# Patient Record
Sex: MALE | Race: WHITE
[De-identification: names, ages, dates, MRNs, and addresses within clinical notes are randomized per-mention and may not be internally consistent; named-entity substitution may affect disease eponyms.]

---

## 2020-11-02 NOTE — CR
PROCEDURE INFORMATION:

Exam: XR Chest, 2 Views

Exam date and time: 11/2/2020 11:28 AM

Age: 30 years old

Clinical indication: Chest pain



TECHNIQUE:

Imaging protocol: XR of the chest

Views: 2 views.



COMPARISON:

No relevant prior studies available.



FINDINGS:

Lungs: Unremarkable. No consolidation.

Pleural space: Unremarkable. No pleural effusion. No pneumothorax.

Heart/Mediastinum: Unremarkable. No cardiomegaly.

Bones/joints: Unremarkable.



IMPRESSION:

No evidence for acute pulmonary disease.



Thank you for allowing us to participate in the care of your patient.



Dictated and Authenticated by: Hardik Clifton MD

11/02/2020 1:02 PM Central Time ([place] & [place])

[facility]

## 2021-11-07 NOTE — EDM.PDOC
ED HPI GENERAL MEDICAL PROBLEM





- General


Chief Complaint: Chest Pain


Stated Complaint: CHEST PAIN


Time Seen by Provider: 11/07/21 11:22


Source of Information: Reports: Patient, RN Notes Reviewed





- History of Present Illness


INITIAL COMMENTS - FREE TEXT/NARRATIVE: 





Pt has been having L sided chest pain off and on for about a month.  Has chronic

anxiety.  Had onset of L chest pain after a walk this morning.  It is now gone. 

Has not been recently ill.  Hx Htn. 


  ** chest


Pain Score (Numeric/FACES): 3





- Related Data


                                    Allergies











Allergy/AdvReac Type Severity Reaction Status Date / Time


 


No Known Allergies Allergy   Verified 11/07/21 11:36











Home Meds: 


                                    Home Meds





lisinopriL [Lisinopril] 1 tab BEDTIME 11/07/21 [History]











Past Medical History


Cardiovascular History: Reports: Hypertension


Respiratory History: Reports: Bronchitis, Recurrent


Gastrointestinal History: Reports: Chronic Constipation, Other (See Below)


Other Gastrointestinal History: in childhood.


Psychiatric History: Reports: Anxiety


Other Psychiatric History: self-diagnosed.





- Infectious Disease History


Infectious Disease History: Reports: Chicken Pox





Social & Family History





- Tobacco Use


Tobacco Use Status *Q: Never Tobacco User





- Caffeine Use


Caffeine Use: Reports: None





- Recreational Drug Use


Recreational Drug Use: Yes


Recreational Drug Type: Reports: Marijuana/Hashish





ED ROS GENERAL





- Review of Systems


Review Of Systems: See Below


Constitutional: Reports: No Symptoms


HEENT: Reports: No Symptoms


Respiratory: Denies: Shortness of Breath, Pleuritic Chest Pain, Cough


Cardiovascular: Reports: Chest Pain


GI/Abdominal: Denies: Abdominal Pain, Nausea, Vomiting


Musculoskeletal: Denies: Shoulder Pain, Arm Pain


Skin: Reports: No Symptoms


Neurological: Reports: No Symptoms





ED EXAM, GENERAL





- Physical Exam


Exam: See Below


General Appearance: Alert, Anxious


Throat/Mouth: Normal Inspection


Head: Atraumatic


Neck: Supple


Respiratory/Chest: No Respiratory Distress, Lungs Clear, Normal Breath Sounds, 

Chest Non-Tender


Cardiovascular: Regular Rate, Rhythm


GI/Abdominal: Soft, Non-Tender.  No: Guarding


Extremities: Normal Inspection.  No: Leg Pain, Increased Warmth, Redness


Neurological: Alert, Oriented, No Motor/Sensory Deficits


Skin Exam: Warm, Dry, Normal Color


  ** #1 Interpretation


EKG Date: 11/07/21


Rhythm: NSR


Axis: Normal


P-Wave: Present


QRS: Normal


ST-T: Normal


QT: Normal





Course





- Vital Signs


Last Recorded V/S: 


                                Last Vital Signs











Temp  98.8 F   11/07/21 11:28


 


Pulse  75   11/07/21 13:28


 


Resp  17   11/07/21 13:28


 


BP  132/78   11/07/21 13:28


 


Pulse Ox  98   11/07/21 13:28














- Orders/Labs/Meds


Labs: 


                                Laboratory Tests











  11/07/21 Range/Units





  12:15 


 


Sodium  137  (136-145)  mEq/L


 


Potassium  4.0  (3.5-5.1)  mEq/L


 


Chloride  100  ()  mEq/L


 


Carbon Dioxide  27  (21-32)  mEq/L


 


Anion Gap  14.0  (5-15)  


 


BUN  9  (7-18)  mg/dL


 


Creatinine  1.0  (0.7-1.3)  mg/dL


 


Est Cr Clr Drug Dosing  108.49  mL/min


 


Estimated GFR (MDRD)  > 60  (>60)  mL/min


 


BUN/Creatinine Ratio  9.0 L  (14-18)  


 


Glucose  113 H  (70-99)  mg/dL


 


Calcium  9.3  (8.5-10.1)  mg/dL


 


Total Bilirubin  0.4  (0.2-1.0)  mg/dL


 


AST  12 L  (15-37)  U/L


 


ALT  27  (16-63)  U/L


 


Alkaline Phosphatase  72  ()  U/L


 


Troponin I  < 0.017  (0.00-0.056)  ng/mL


 


Total Protein  7.3  (6.4-8.2)  g/dl


 


Albumin  4.0  (3.4-5.0)  g/dl


 


Globulin  3.3  gm/dL


 


Albumin/Globulin Ratio  1.2  (1-2)  














- Re-Assessments/Exams


Free Text/Narrative Re-Assessment/Exam: 





11/07/21 13:35


trop, other labs normal. 





Departure





- Departure


Time of Disposition: 13:29


Disposition: Home, Self-Care 01


Condition: Fair


Clinical Impression: 


 Atypical chest pain, Anxiety





Referrals: 


Moris Fischer MD [Primary Care Provider] - 


Forms:  ED Department Discharge


Additional Instructions: 


Your heart and lungs check out well today.  Continue healthy diet.  Exercise 

regularly at least 4 to 5 times a week.  Get the Cardiac risk CT as planned.  

Follow up with Dr Patrick as planned.  Continue prozac as prescribed.  





Sepsis Event Note (ED)





- Evaluation


Sepsis Screening Result: No Definite Risk





- Focused Exam


Vital Signs: 


                                   Vital Signs











  Temp Pulse Resp BP Pulse Ox


 


 11/07/21 13:28   75  17  132/78  98


 


 11/07/21 12:45   83  20  130/77  99


 


 11/07/21 11:45   99  20  168/95 H  100


 


 11/07/21 11:28  98.8 F  93  17  159/106 H  96

## 2023-02-22 ENCOUNTER — HOSPITAL ENCOUNTER (EMERGENCY)
Dept: HOSPITAL 41 - JD.ED | Age: 33
Discharge: HOME | End: 2023-02-22
Payer: COMMERCIAL

## 2023-02-22 DIAGNOSIS — M79.81: Primary | ICD-10-CM

## 2023-02-22 DIAGNOSIS — Z79.899: ICD-10-CM

## 2023-02-22 DIAGNOSIS — I10: ICD-10-CM

## 2023-02-22 DIAGNOSIS — Z98.52: ICD-10-CM

## 2023-02-22 LAB — EGFRCR SERPLBLD CKD-EPI 2021: 115 ML/MIN (ref 60–?)
